# Patient Record
Sex: FEMALE | Race: OTHER | HISPANIC OR LATINO | URBAN - METROPOLITAN AREA
[De-identification: names, ages, dates, MRNs, and addresses within clinical notes are randomized per-mention and may not be internally consistent; named-entity substitution may affect disease eponyms.]

---

## 2017-06-04 ENCOUNTER — EMERGENCY (EMERGENCY)
Facility: HOSPITAL | Age: 47
LOS: 1 days | Discharge: HOME | End: 2017-06-04
Attending: EMERGENCY MEDICINE | Admitting: EMERGENCY MEDICINE
Payer: COMMERCIAL

## 2017-06-04 VITALS
SYSTOLIC BLOOD PRESSURE: 121 MMHG | TEMPERATURE: 98 F | OXYGEN SATURATION: 100 % | RESPIRATION RATE: 16 BRPM | HEART RATE: 76 BPM | DIASTOLIC BLOOD PRESSURE: 68 MMHG

## 2017-06-04 PROCEDURE — 99284 EMERGENCY DEPT VISIT MOD MDM: CPT

## 2017-06-04 RX ORDER — KETOROLAC TROMETHAMINE 30 MG/ML
60 SYRINGE (ML) INJECTION ONCE
Qty: 0 | Refills: 0 | Status: DISCONTINUED | OUTPATIENT
Start: 2017-06-04 | End: 2017-06-04

## 2017-06-04 RX ADMIN — Medication 60 MILLIGRAM(S): at 14:10

## 2017-06-04 RX ADMIN — Medication 60 MILLIGRAM(S): at 13:32

## 2017-06-04 NOTE — ED PROVIDER NOTE - MUSCULOSKELETAL MINIMAL EXAM
limited arom b/l shoulders secondary to pain. full prom but painful. tender over b/l biceps, no bony tenderness or deformity

## 2017-06-04 NOTE — ED PROVIDER NOTE - PMH
Arthritis    Disc  bulging  GERD (gastroesophageal reflux disease)    ITP (idiopathic thrombocytopenic purpura)

## 2017-06-04 NOTE — ED PROVIDER NOTE - OBJECTIVE STATEMENT
patient complaining of b/l shoulder pain. states right shoulder with pain greater than 1 month duration, worse with movement, hurting in biceps. denies trauma. left shoulder/bicep hurting since thursday, denies trauma. taking no otc due to concern about pmh of itp.  reports h/o spinal stenosis and is not under care of doctor, is on no pain management regimen. denies bruising, bleeding.

## 2017-06-04 NOTE — ED ADULT TRIAGE NOTE - CHIEF COMPLAINT QUOTE
C/o generalized joint pain and b/l shoulders radiating to bicep pain x 3 days. Denies fevers/chills/chest pain/sob/dizziness/lightheadedness. PMH ITP. Limited ROM to both shoulders. C/o generalized joint pain and b/l shoulders radiating to bicep pain and axillary area x 3 days. Denies fevers/chills/chest pain/sob/dizziness/lightheadedness/trauma/fall/heavy lifting. PMH ITP. Limited ROM to both shoulders.

## 2017-06-04 NOTE — ED PROVIDER NOTE - MEDICAL DECISION MAKING DETAILS
Attending Note (Alina): patient with b/l shoulder pain and bicep tenderness. likely muscle strain/OA.  normal neuro exam. will start trial of nsaids.

## 2017-06-04 NOTE — ED PROVIDER NOTE - NEUROLOGICAL, MLM
Alert and oriented, no focal deficits, no motor or sensory deficits.  equal power and sensation b/l.

## 2017-09-06 ENCOUNTER — OUTPATIENT (OUTPATIENT)
Dept: OUTPATIENT SERVICES | Facility: HOSPITAL | Age: 47
LOS: 1 days | Discharge: HOME | End: 2017-09-06

## 2017-09-06 DIAGNOSIS — D69.3 IMMUNE THROMBOCYTOPENIC PURPURA: ICD-10-CM

## 2017-09-07 ENCOUNTER — APPOINTMENT (OUTPATIENT)
Dept: HEMATOLOGY ONCOLOGY | Facility: CLINIC | Age: 47
End: 2017-09-07

## 2017-09-07 ENCOUNTER — RESULT REVIEW (OUTPATIENT)
Age: 47
End: 2017-09-07

## 2017-09-07 LAB
HCT VFR BLD CALC: 39 % — SIGNIFICANT CHANGE UP (ref 34.5–45)
HGB BLD-MCNC: 13.8 G/DL — SIGNIFICANT CHANGE UP (ref 11.5–15.5)
MCHC RBC-ENTMCNC: 28.8 PG — SIGNIFICANT CHANGE UP (ref 27–34)
MCHC RBC-ENTMCNC: 35.5 G/DL — SIGNIFICANT CHANGE UP (ref 32–36)
MCV RBC AUTO: 81 FL — SIGNIFICANT CHANGE UP (ref 80–100)
PLATELET # BLD AUTO: 150 K/UL — SIGNIFICANT CHANGE UP (ref 150–400)
RBC # BLD: 4.81 M/UL — SIGNIFICANT CHANGE UP (ref 3.8–5.2)
RBC # FLD: 13.3 % — SIGNIFICANT CHANGE UP (ref 10.3–14.5)
WBC # BLD: 8 K/UL — SIGNIFICANT CHANGE UP (ref 3.8–10.5)
WBC # FLD AUTO: 8 K/UL — SIGNIFICANT CHANGE UP (ref 3.8–10.5)

## 2017-09-23 ENCOUNTER — TRANSCRIPTION ENCOUNTER (OUTPATIENT)
Age: 47
End: 2017-09-23

## 2019-05-24 ENCOUNTER — OUTPATIENT (OUTPATIENT)
Dept: OUTPATIENT SERVICES | Facility: HOSPITAL | Age: 49
LOS: 1 days | Discharge: HOME | End: 2019-05-24

## 2019-05-24 DIAGNOSIS — D69.3 IMMUNE THROMBOCYTOPENIC PURPURA: ICD-10-CM

## 2019-06-18 NOTE — REASON FOR VISIT
[Follow-Up Visit] : a follow-up visit for [Blood Count Assessment] : blood count assessment [FreeTextEntry2] : thrombocytopenia

## 2019-06-18 NOTE — HISTORY OF PRESENT ILLNESS
[Disease:__________________________] : Disease: [unfilled] [de-identified] : The patient noted the onset of spontaneous ecchymosis over the left arm in the second week of November 2015. She was admitted to [facility] on November 17 2015 for evaluation of a low platelet count of 41 000 She has had a history of feeling ache in chest and back, legs and arms. No fever. No nausea and no vomiting. She remained at [facility] overnight November 18 on prednisone 60 mg PO daily. No mouth bleeding [de-identified] : Deann has not been seen in our office since 2016.

## 2019-06-19 ENCOUNTER — APPOINTMENT (OUTPATIENT)
Dept: HEMATOLOGY ONCOLOGY | Facility: CLINIC | Age: 49
End: 2019-06-19

## 2020-09-18 ENCOUNTER — OUTPATIENT (OUTPATIENT)
Dept: OUTPATIENT SERVICES | Facility: HOSPITAL | Age: 50
LOS: 1 days | Discharge: HOME | End: 2020-09-18

## 2020-09-18 DIAGNOSIS — D69.3 IMMUNE THROMBOCYTOPENIC PURPURA: ICD-10-CM

## 2020-09-23 ENCOUNTER — APPOINTMENT (OUTPATIENT)
Dept: HEMATOLOGY ONCOLOGY | Facility: CLINIC | Age: 50
End: 2020-09-23
Payer: COMMERCIAL

## 2020-09-23 ENCOUNTER — RESULT REVIEW (OUTPATIENT)
Age: 50
End: 2020-09-23

## 2020-09-23 ENCOUNTER — TRANSCRIPTION ENCOUNTER (OUTPATIENT)
Age: 50
End: 2020-09-23

## 2020-09-23 VITALS
OXYGEN SATURATION: 98 % | SYSTOLIC BLOOD PRESSURE: 126 MMHG | RESPIRATION RATE: 16 BRPM | DIASTOLIC BLOOD PRESSURE: 77 MMHG | BODY MASS INDEX: 35.04 KG/M2 | WEIGHT: 210.32 LBS | TEMPERATURE: 98.3 F | HEART RATE: 65 BPM | HEIGHT: 64.88 IN

## 2020-09-23 LAB
BASOPHILS # BLD AUTO: 0.01 K/UL — SIGNIFICANT CHANGE UP (ref 0–0.2)
BASOPHILS NFR BLD AUTO: 0.2 % — SIGNIFICANT CHANGE UP (ref 0–2)
EOSINOPHIL # BLD AUTO: 0.01 K/UL — SIGNIFICANT CHANGE UP (ref 0–0.5)
EOSINOPHIL NFR BLD AUTO: 0.2 % — SIGNIFICANT CHANGE UP (ref 0–6)
HCT VFR BLD CALC: 41.1 % — SIGNIFICANT CHANGE UP (ref 34.5–45)
HGB BLD-MCNC: 13 G/DL — SIGNIFICANT CHANGE UP (ref 11.5–15.5)
IMM GRANULOCYTES NFR BLD AUTO: 0.5 % — SIGNIFICANT CHANGE UP (ref 0–1.5)
LYMPHOCYTES # BLD AUTO: 1.42 K/UL — SIGNIFICANT CHANGE UP (ref 1–3.3)
LYMPHOCYTES # BLD AUTO: 23.8 % — SIGNIFICANT CHANGE UP (ref 13–44)
MCHC RBC-ENTMCNC: 26.7 PG — LOW (ref 27–34)
MCHC RBC-ENTMCNC: 31.6 G/DL — LOW (ref 32–36)
MCV RBC AUTO: 84.6 FL — SIGNIFICANT CHANGE UP (ref 80–100)
MONOCYTES # BLD AUTO: 0.46 K/UL — SIGNIFICANT CHANGE UP (ref 0–0.9)
MONOCYTES NFR BLD AUTO: 7.7 % — SIGNIFICANT CHANGE UP (ref 2–14)
NEUTROPHILS # BLD AUTO: 4.04 K/UL — SIGNIFICANT CHANGE UP (ref 1.8–7.4)
NEUTROPHILS NFR BLD AUTO: 67.6 % — SIGNIFICANT CHANGE UP (ref 43–77)
NRBC # BLD: 0 /100 WBCS — SIGNIFICANT CHANGE UP (ref 0–0)
PLATELET # BLD AUTO: 114 K/UL — LOW (ref 150–400)
RBC # BLD: 4.86 M/UL — SIGNIFICANT CHANGE UP (ref 3.8–5.2)
RBC # FLD: 14.5 % — SIGNIFICANT CHANGE UP (ref 10.3–14.5)
WBC # BLD: 5.97 K/UL — SIGNIFICANT CHANGE UP (ref 3.8–10.5)
WBC # FLD AUTO: 5.97 K/UL — SIGNIFICANT CHANGE UP (ref 3.8–10.5)

## 2020-09-23 PROCEDURE — 99205 OFFICE O/P NEW HI 60 MIN: CPT

## 2020-09-23 NOTE — REASON FOR VISIT
[Initial Consultation] : an initial consultation for [Blood Count Assessment] : blood count assessment [FreeTextEntry2] : I have a history of thrombocytopenia. I have not been seen by the office in 4 years

## 2020-09-23 NOTE — ASSESSMENT
[Supportive] : Goals of care discussed with patient: Supportive [Palliative Care Plan] : not applicable at this time [FreeTextEntry1] : This is my first "annual visit with ms SUKHWINDER Zacarias whom I have not seen in 4 years. Today I spent time discussing the history of new diagnosis over the past four years and her recent occurrence of mild thrombocytopenia. She showed me cell phone images of small bruising over her arms and her legs which may be seen with mild thrombocytopenia.\par Today the extent of her thrombocytopenia is 114 000 and there is no clumping no the review of the peripheral blood smear.. She has no symptoms and she may be recently diagnosed to have type 2 diabetes. \par Steroid therapy would be held at this time for concern of elevating her blood sugar.\par I would advise observation. She is asked to return in 4 weeks for repeat blood testing.\par Aspects of treatment in the future may include steroids or IV immune globulin if platelet counts are persistently below 50 000 or if bleeding occurs. Patient is aware to return to the office if spontaneous bleeding occurs

## 2020-09-23 NOTE — HISTORY OF PRESENT ILLNESS
[Disease:__________________________] : Disease: [unfilled] [de-identified] : The patient noted the onset of spontaneous ecchymosis over the left arm in the second week of November 2015. She was admitted to [facility] on November 17 2015 for evaluation of a low platelet count of 41 000 She has had a history of feeling ache in chest and back, legs and arms. No fever. No nausea and no vomiting. She remained at [facility] overnight November 18 on prednisone 60 mg PO daily. No mouth bleeding\par The patient was tapered off of prednisone and she did not have bleeding for four years.\par Her platelet count was 200 000 in January 2020; She did no have bleeding at that time.\par She is seen by a rheumatologist: Soy Ayala. She had been diagnosed to have fibromyalgia in 2017. She was also diagnosed to have type 2 diabetes on September 4. She was noted to have a platelet count of 100 000 on 09/04/2020

## 2020-09-23 NOTE — RESULTS/DATA
[FreeTextEntry1] : review of information in the EM HR: the patient has platelet count of 114 000 WBC 5.97 HGB 13.0

## 2020-09-24 LAB
ALBUMIN SERPL ELPH-MCNC: 4.6 G/DL
ALP BLD-CCNC: 89 U/L
ALT SERPL-CCNC: 23 U/L
ANION GAP SERPL CALC-SCNC: 16 MMOL/L
AST SERPL-CCNC: 29 U/L
BILIRUB SERPL-MCNC: 0.4 MG/DL
BUN SERPL-MCNC: 16 MG/DL
CALCIUM SERPL-MCNC: 10.1 MG/DL
CHLORIDE SERPL-SCNC: 104 MMOL/L
CO2 SERPL-SCNC: 22 MMOL/L
CREAT SERPL-MCNC: 0.81 MG/DL
GLUCOSE SERPL-MCNC: 101 MG/DL
HAV IGM SER QL: NONREACTIVE
HBV CORE IGM SER QL: NONREACTIVE
HBV SURFACE AG SER QL: NONREACTIVE
HCV AB SER QL: NONREACTIVE
HCV S/CO RATIO: 0.08 S/CO
POTASSIUM SERPL-SCNC: 4 MMOL/L
PROT SERPL-MCNC: 6.9 G/DL
SODIUM SERPL-SCNC: 142 MMOL/L

## 2020-10-21 ENCOUNTER — OUTPATIENT (OUTPATIENT)
Dept: OUTPATIENT SERVICES | Facility: HOSPITAL | Age: 50
LOS: 1 days | Discharge: HOME | End: 2020-10-21

## 2020-10-21 DIAGNOSIS — D69.3 IMMUNE THROMBOCYTOPENIC PURPURA: ICD-10-CM

## 2020-10-26 ENCOUNTER — RESULT REVIEW (OUTPATIENT)
Age: 50
End: 2020-10-26

## 2020-10-26 ENCOUNTER — APPOINTMENT (OUTPATIENT)
Dept: HEMATOLOGY ONCOLOGY | Facility: CLINIC | Age: 50
End: 2020-10-26
Payer: COMMERCIAL

## 2020-10-26 VITALS
BODY MASS INDEX: 33.86 KG/M2 | TEMPERATURE: 98.1 F | SYSTOLIC BLOOD PRESSURE: 120 MMHG | HEART RATE: 55 BPM | DIASTOLIC BLOOD PRESSURE: 73 MMHG | WEIGHT: 203.24 LBS | RESPIRATION RATE: 16 BRPM | HEIGHT: 64.88 IN | OXYGEN SATURATION: 98 %

## 2020-10-26 LAB
BASOPHILS # BLD AUTO: 0 K/UL — SIGNIFICANT CHANGE UP (ref 0–0.2)
BASOPHILS NFR BLD AUTO: 0 % — SIGNIFICANT CHANGE UP (ref 0–2)
EOSINOPHIL # BLD AUTO: 0 K/UL — SIGNIFICANT CHANGE UP (ref 0–0.5)
EOSINOPHIL NFR BLD AUTO: 0 % — SIGNIFICANT CHANGE UP (ref 0–6)
HCT VFR BLD CALC: 42.2 % — SIGNIFICANT CHANGE UP (ref 34.5–45)
HGB BLD-MCNC: 13.8 G/DL — SIGNIFICANT CHANGE UP (ref 11.5–15.5)
IMM GRANULOCYTES NFR BLD AUTO: 0.9 % — SIGNIFICANT CHANGE UP (ref 0–1.5)
LYMPHOCYTES # BLD AUTO: 1.28 K/UL — SIGNIFICANT CHANGE UP (ref 1–3.3)
LYMPHOCYTES # BLD AUTO: 23.1 % — SIGNIFICANT CHANGE UP (ref 13–44)
MCHC RBC-ENTMCNC: 27.2 PG — SIGNIFICANT CHANGE UP (ref 27–34)
MCHC RBC-ENTMCNC: 32.7 G/DL — SIGNIFICANT CHANGE UP (ref 32–36)
MCV RBC AUTO: 83.2 FL — SIGNIFICANT CHANGE UP (ref 80–100)
MONOCYTES # BLD AUTO: 0.37 K/UL — SIGNIFICANT CHANGE UP (ref 0–0.9)
MONOCYTES NFR BLD AUTO: 6.7 % — SIGNIFICANT CHANGE UP (ref 2–14)
NEUTROPHILS # BLD AUTO: 3.83 K/UL — SIGNIFICANT CHANGE UP (ref 1.8–7.4)
NEUTROPHILS NFR BLD AUTO: 69.3 % — SIGNIFICANT CHANGE UP (ref 43–77)
NRBC # BLD: 0 /100 WBCS — SIGNIFICANT CHANGE UP (ref 0–0)
PLATELET # BLD AUTO: 75 K/UL — LOW (ref 150–400)
RBC # BLD: 5.07 M/UL — SIGNIFICANT CHANGE UP (ref 3.8–5.2)
RBC # FLD: 14.5 % — SIGNIFICANT CHANGE UP (ref 10.3–14.5)
WBC # BLD: 5.53 K/UL — SIGNIFICANT CHANGE UP (ref 3.8–10.5)
WBC # FLD AUTO: 5.53 K/UL — SIGNIFICANT CHANGE UP (ref 3.8–10.5)

## 2020-10-26 PROCEDURE — 99214 OFFICE O/P EST MOD 30 MIN: CPT

## 2020-10-26 PROCEDURE — 99072 ADDL SUPL MATRL&STAF TM PHE: CPT

## 2020-10-27 NOTE — HISTORY OF PRESENT ILLNESS
[Disease:__________________________] : Disease: [unfilled] [de-identified] : 50 year old female presenting to [facility] for hematologic care. She is referred here from [facility]. Patient went to [facility] ED on 11/17/2015 due to easy bruising for 1-2 weeks. She noticed multiple bruises across her legs and arms; denied injuries/trauma, epistaxis, bleeding gums, rash, weakness, weight loss, or fatigue, chest pain, palpitations, shortness of breath. Her platelet count at time of arrival was 44,000. She was discharged that same day with prednisone 60 mg daily and followed up with Dr. Rony Granados at [facility] for outpatient follow-up. \par \par Patient was tapered off oral steroid therapy and she did not have bleeding/bruising from 2016 - 2020. Her platelet count was 200 000 in January 2020; She didn't have bleeding at that time. While on surveillance, she was seen by a rheumatologist: Dr. Soy Ricci. She was also diagnosed to have type 2 diabetes on September 4. She was noted to have a platelet count of 100 000 on 09/04/2020\par \par Past medical history includes borderline diabetes (diagnosed during September 8, 2020, recommended diet/exercise), hyperlipidemia, fibromyalgia (diagnosed 2017), H Pylori infection (diagnosed late December 2019 via endoscopy by Dr. Velásquez at [facility] - started antibiotics to 12/27/2019), fatty liver (confirmed via US abdomen on August 24 2020). No new medications. No hx of clotting disorders. Colonoscopy + endoscopy done on June 2020 (polyps removed, benign findings).   [FreeTextEntry1] : s/p oral steroid therapy; currently on surveillance [de-identified] : Patient presented to [facility] for routine blood count assessment and follow-up office visit. She continues to make a conscious effort in maintaining her diet and exercise regimen since her diabetes diagnosis last month. She denied easy bleeding/bruising at this time. Her current medication list includes Cholesterol Care Supplement, Vitamin D, Vitamin B12, FIsh Oil and cinnamon extract.

## 2020-10-27 NOTE — ASSESSMENT
[Supportive] : Goals of care discussed with patient: Supportive [Palliative Care Plan] : not applicable at this time [FreeTextEntry1] : Deann Wilcox is a 50 year old female presenting to [facility] for hematologic care of thrombocytopenia. Patient returned to the office last month after a 4 year hiatus due to recent recurrence of ecchymosis and low platelet counts from her rheumatologist's office. She provided cell phone images of small bruising over her arms and her legs which may be seen with mild thrombocytopenia.\par \par Patient appeared well and her physical examination findings remain stable. Today her platelet count is 75,000. She has no symptoms and steroid therapy is held due to her recent diabetes diagnosis. She agreed on the following: \par \par - Remain on observation as directed; return to the office next month for repeat blood testing.\par - Consideration of bone marrow biopsy; she does not recall completing this procedure in the past.  \par - Aspects of treatment in the future may include oral steroids or IV immune globulin if platelet counts are persistently below 50 000 and/or if bleeding occurs. \par - Patient is aware to return to the office sooner if spontaneous bleeding occurs. \par - Follow up with primary care and rheumatology as directed.

## 2020-11-11 ENCOUNTER — RESULT REVIEW (OUTPATIENT)
Age: 50
End: 2020-11-11

## 2020-11-11 ENCOUNTER — APPOINTMENT (OUTPATIENT)
Dept: HEMATOLOGY ONCOLOGY | Facility: CLINIC | Age: 50
End: 2020-11-11
Payer: COMMERCIAL

## 2020-11-11 VITALS
TEMPERATURE: 98.9 F | SYSTOLIC BLOOD PRESSURE: 117 MMHG | DIASTOLIC BLOOD PRESSURE: 78 MMHG | HEART RATE: 57 BPM | OXYGEN SATURATION: 95 % | WEIGHT: 200.18 LBS | RESPIRATION RATE: 16 BRPM | BODY MASS INDEX: 33.35 KG/M2 | HEIGHT: 64.88 IN

## 2020-11-11 LAB
BASOPHILS # BLD AUTO: 0 K/UL — SIGNIFICANT CHANGE UP (ref 0–0.2)
BASOPHILS NFR BLD AUTO: 0 % — SIGNIFICANT CHANGE UP (ref 0–2)
EOSINOPHIL # BLD AUTO: 0 K/UL — SIGNIFICANT CHANGE UP (ref 0–0.5)
EOSINOPHIL NFR BLD AUTO: 0 % — SIGNIFICANT CHANGE UP (ref 0–6)
HCT VFR BLD CALC: 41.4 % — SIGNIFICANT CHANGE UP (ref 34.5–45)
HGB BLD-MCNC: 13.4 G/DL — SIGNIFICANT CHANGE UP (ref 11.5–15.5)
IMM GRANULOCYTES NFR BLD AUTO: 0.2 % — SIGNIFICANT CHANGE UP (ref 0–1.5)
LYMPHOCYTES # BLD AUTO: 1.47 K/UL — SIGNIFICANT CHANGE UP (ref 1–3.3)
LYMPHOCYTES # BLD AUTO: 26.7 % — SIGNIFICANT CHANGE UP (ref 13–44)
MCHC RBC-ENTMCNC: 27.2 PG — SIGNIFICANT CHANGE UP (ref 27–34)
MCHC RBC-ENTMCNC: 32.4 G/DL — SIGNIFICANT CHANGE UP (ref 32–36)
MCV RBC AUTO: 84 FL — SIGNIFICANT CHANGE UP (ref 80–100)
MONOCYTES # BLD AUTO: 0.43 K/UL — SIGNIFICANT CHANGE UP (ref 0–0.9)
MONOCYTES NFR BLD AUTO: 7.8 % — SIGNIFICANT CHANGE UP (ref 2–14)
NEUTROPHILS # BLD AUTO: 3.6 K/UL — SIGNIFICANT CHANGE UP (ref 1.8–7.4)
NEUTROPHILS NFR BLD AUTO: 65.3 % — SIGNIFICANT CHANGE UP (ref 43–77)
NRBC # BLD: 0 /100 WBCS — SIGNIFICANT CHANGE UP (ref 0–0)
PLATELET # BLD AUTO: 76 K/UL — LOW (ref 150–400)
RBC # BLD: 4.93 M/UL — SIGNIFICANT CHANGE UP (ref 3.8–5.2)
RBC # FLD: 14.4 % — SIGNIFICANT CHANGE UP (ref 10.3–14.5)
WBC # BLD: 5.51 K/UL — SIGNIFICANT CHANGE UP (ref 3.8–10.5)
WBC # FLD AUTO: 5.51 K/UL — SIGNIFICANT CHANGE UP (ref 3.8–10.5)

## 2020-11-11 PROCEDURE — 99214 OFFICE O/P EST MOD 30 MIN: CPT

## 2020-11-11 PROCEDURE — 99072 ADDL SUPL MATRL&STAF TM PHE: CPT

## 2020-11-12 LAB
ALBUMIN SERPL ELPH-MCNC: 4.7 G/DL
ALP BLD-CCNC: 90 U/L
ALT SERPL-CCNC: 23 U/L
ANION GAP SERPL CALC-SCNC: 12 MMOL/L
AST SERPL-CCNC: 25 U/L
BILIRUB SERPL-MCNC: 0.5 MG/DL
BUN SERPL-MCNC: 15 MG/DL
CALCIUM SERPL-MCNC: 10.2 MG/DL
CHLORIDE SERPL-SCNC: 102 MMOL/L
CO2 SERPL-SCNC: 26 MMOL/L
CREAT SERPL-MCNC: 0.83 MG/DL
GLUCOSE SERPL-MCNC: 95 MG/DL
POTASSIUM SERPL-SCNC: 4.1 MMOL/L
PROT SERPL-MCNC: 6.9 G/DL
SODIUM SERPL-SCNC: 141 MMOL/L

## 2020-11-13 NOTE — REASON FOR VISIT
[Follow-Up Visit] : a follow-up visit for [Blood Count Assessment] : blood count assessment [FreeTextEntry2] : low platelet count; immune thrombocytopenia

## 2020-11-13 NOTE — ASSESSMENT
[Supportive] : Goals of care discussed with patient: Supportive [Palliative Care Plan] : not applicable at this time [FreeTextEntry1] : JARRELL Wilcox is a 50 year old female with a history of immune thrombocytopenia,. Shehas been off steroid treatment since 2016; [place] count has [place] [address] over two weeks. No mucous membrane bleeding. She feels well and she does not wish to begin steroid treatment for concern of exacerbation of her diabetes. NO bleeding in genital urinary or Gastro intestinal tract by history. We will continue to follow her off treatment and she will be seen by me and Mr MANUEL ERWIN in 4 weeks. She is avoiding use of aspirin, ibuprofen and she does not use alcohol beverage.

## 2020-11-13 NOTE — HISTORY OF PRESENT ILLNESS
[Disease:__________________________] : Disease: [unfilled] [Date: ____________] : Patient's last distress assessment performed on [unfilled]. [0 - No Distress] : Distress Level: 0 [de-identified] : The patient noted the onset of spontaneous ecchymosis over the left arm in the second week of November 2015. She was admitted to [facility] on November 17 2015 for evaluation of a low platelet count of 41 000 She has had a history of feeling ache in chest and back, legs and arms. No fever. No nausea and no vomiting. She remained at [facility] overnight November 18 1015 on prednisone 60 mg PO daily. No mouth bleeding\par The patient was tapered off of prednisone and she did not have bleeding for four years.\par Her platelet count was 200 000 in January 2020; She did no have bleeding at that time.\par She is seen by a rheumatologist: Soy Ayala. She had been diagnosed to have fibromyalgia in 2017. She was also diagnosed to have type 2 diabetes on September 4. She was noted to have a platelet count of 100 000 on 09/04/2020 [FreeTextEntry1] : history of steroid use, observation [de-identified] : Deann has not been seen in our office since 2016.She was referred by primary care in September because of a decline in platelet count. This represents her third follow up visit; no visible bleeding although she notes rare bruises over her arms and legs. Bruising is spontaneous; no ophthalmic or mucous membrane bleeding. No discoloration of urine or stool. No febrile illness. She is wearing a mask and she practices social distancing. Family is well. No one is known to have viral illness

## 2020-11-13 NOTE — PHYSICAL EXAM
[Restricted in physically strenuous activity but ambulatory and able to carry out work of a light or sedentary nature] : Status 1- Restricted in physically strenuous activity but ambulatory and able to carry out work of a light or sedentary nature, e.g., light house work, office work [Normal] : affect appropriate [de-identified] : cell phone images of fading bruising on arms and legs

## 2020-12-04 ENCOUNTER — OUTPATIENT (OUTPATIENT)
Dept: OUTPATIENT SERVICES | Facility: HOSPITAL | Age: 50
LOS: 1 days | Discharge: HOME | End: 2020-12-04

## 2020-12-04 DIAGNOSIS — D69.3 IMMUNE THROMBOCYTOPENIC PURPURA: ICD-10-CM

## 2020-12-08 ENCOUNTER — RESULT REVIEW (OUTPATIENT)
Age: 50
End: 2020-12-08

## 2020-12-08 ENCOUNTER — APPOINTMENT (OUTPATIENT)
Dept: HEMATOLOGY ONCOLOGY | Facility: CLINIC | Age: 50
End: 2020-12-08
Payer: COMMERCIAL

## 2020-12-08 VITALS
BODY MASS INDEX: 33.24 KG/M2 | TEMPERATURE: 97.5 F | RESPIRATION RATE: 16 BRPM | SYSTOLIC BLOOD PRESSURE: 119 MMHG | HEART RATE: 58 BPM | WEIGHT: 199.52 LBS | HEIGHT: 64.88 IN | OXYGEN SATURATION: 98 % | DIASTOLIC BLOOD PRESSURE: 85 MMHG

## 2020-12-08 DIAGNOSIS — R73.03 PREDIABETES.: ICD-10-CM

## 2020-12-08 DIAGNOSIS — D69.3 IMMUNE THROMBOCYTOPENIC PURPURA: ICD-10-CM

## 2020-12-08 LAB
BASOPHILS # BLD AUTO: 0 K/UL — SIGNIFICANT CHANGE UP (ref 0–0.2)
BASOPHILS NFR BLD AUTO: 0 % — SIGNIFICANT CHANGE UP (ref 0–2)
EOSINOPHIL # BLD AUTO: 0.01 K/UL — SIGNIFICANT CHANGE UP (ref 0–0.5)
EOSINOPHIL NFR BLD AUTO: 0.2 % — SIGNIFICANT CHANGE UP (ref 0–6)
HCT VFR BLD CALC: 40.4 % — SIGNIFICANT CHANGE UP (ref 34.5–45)
HGB BLD-MCNC: 13.7 G/DL — SIGNIFICANT CHANGE UP (ref 11.5–15.5)
IMM GRANULOCYTES NFR BLD AUTO: 0.3 % — SIGNIFICANT CHANGE UP (ref 0–1.5)
LYMPHOCYTES # BLD AUTO: 1.46 K/UL — SIGNIFICANT CHANGE UP (ref 1–3.3)
LYMPHOCYTES # BLD AUTO: 23.1 % — SIGNIFICANT CHANGE UP (ref 13–44)
MCHC RBC-ENTMCNC: 27.5 PG — SIGNIFICANT CHANGE UP (ref 27–34)
MCHC RBC-ENTMCNC: 33.9 G/DL — SIGNIFICANT CHANGE UP (ref 32–36)
MCV RBC AUTO: 81.1 FL — SIGNIFICANT CHANGE UP (ref 80–100)
MONOCYTES # BLD AUTO: 0.49 K/UL — SIGNIFICANT CHANGE UP (ref 0–0.9)
MONOCYTES NFR BLD AUTO: 7.8 % — SIGNIFICANT CHANGE UP (ref 2–14)
NEUTROPHILS # BLD AUTO: 4.34 K/UL — SIGNIFICANT CHANGE UP (ref 1.8–7.4)
NEUTROPHILS NFR BLD AUTO: 68.6 % — SIGNIFICANT CHANGE UP (ref 43–77)
NRBC # BLD: 0 /100 WBCS — SIGNIFICANT CHANGE UP (ref 0–0)
PLATELET # BLD AUTO: 82 K/UL — LOW (ref 150–400)
RBC # BLD: 4.98 M/UL — SIGNIFICANT CHANGE UP (ref 3.8–5.2)
RBC # FLD: 14.6 % — HIGH (ref 10.3–14.5)
WBC # BLD: 6.32 K/UL — SIGNIFICANT CHANGE UP (ref 3.8–10.5)
WBC # FLD AUTO: 6.32 K/UL — SIGNIFICANT CHANGE UP (ref 3.8–10.5)

## 2020-12-08 PROCEDURE — 99214 OFFICE O/P EST MOD 30 MIN: CPT

## 2020-12-08 PROCEDURE — 99072 ADDL SUPL MATRL&STAF TM PHE: CPT

## 2020-12-08 NOTE — ASSESSMENT
[Supportive] : Goals of care discussed with patient: Supportive [Palliative Care Plan] : not applicable at this time [FreeTextEntry1] : Deann Wilcox is a 50 year old female presenting to [facility] for hematologic care of thrombocytopenia. Patient returned to the office in September 2020 after a 4 year hiatus due to recent recurrence of ecchymosis and low platelet counts from her rheumatologist office. She provided cell phone images of small bruising over her arms and her legs which may be seen with mild thrombocytopenia. Patient appeared well and her physical examination findings remain stable. Today her platelet count is 82,000. She has no symptoms and steroid therapy is held due to her current diabetes diagnosis. She agreed on the following: \par \par - Remain on observation as directed; no plans for bone marrow biopsy at this time. \par - Treatments to consider in the future may include oral steroids or IV immune globulin if platelet counts are persistently below 50 000 and/or if bleeding occurs. \par - Patient is aware to return to the office sooner if spontaneous bleeding occurs. \par - Follow up with primary care and rheumatology as directed. \par - Return to the office in 6 months. Discussed with Dr. Rony Granados.

## 2020-12-08 NOTE — REVIEW OF SYSTEMS
[Joint Pain] : joint pain [Easy Bruising] : a tendency for easy bruising [Negative] : Allergic/Immunologic [Fever] : no fever [Chills] : no chills [Night Sweats] : no night sweats [Fatigue] : no fatigue [Recent Change In Weight] : ~T no recent weight change [Eye Pain] : no eye pain [Red Eyes] : eyes not red [Dry Eyes] : no dryness of the eyes [Vision Problems] : no vision problems [Dysphagia] : no dysphagia [Loss of Hearing] : no loss of hearing [Nosebleeds] : no nosebleeds [Hoarseness] : no hoarseness [Odynophagia] : no odynophagia [Mucosal Pain] : no mucosal pain [Chest Pain] : no chest pain [Palpitations] : no palpitations [Leg Claudication] : no intermittent leg claudication [Lower Ext Edema] : no lower extremity edema [Shortness Of Breath] : no shortness of breath [Wheezing] : no wheezing [Cough] : no cough [SOB on Exertion] : no shortness of breath during exertion [Abdominal Pain] : no abdominal pain [Vomiting] : no vomiting [Constipation] : no constipation [Diarrhea] : no diarrhea [Dysuria] : no dysuria [Incontinence] : no incontinence [Vaginal Discharge] : no vaginal discharge [Dysmenorrhea/Abn Vaginal Bleeding] : no dysmenorrhea/abnormal vaginal bleeding [Joint Stiffness] : no joint stiffness [Muscle Pain] : no muscle pain [Skin Rash] : no skin rash [Skin Wound] : no skin wound [Confused] : no confusion [Dizziness] : no dizziness [Fainting] : no fainting [Difficulty Walking] : no difficulty walking [Suicidal] : not suicidal [Insomnia] : no insomnia [Anxiety] : no anxiety [Depression] : no depression [Proptosis] : no proptosis [Hot Flashes] : no hot flashes [Muscle Weakness] : no muscle weakness [Deepening Of The Voice] : no deepening of the voice [Easy Bleeding] : no tendency for easy bleeding [Swollen Glands] : no swollen glands

## 2021-10-30 NOTE — HISTORY OF PRESENT ILLNESS
[Disease:__________________________] : Disease: [unfilled] [de-identified] : 50 year old female presenting to [facility] for hematologic care. She is referred here from [facility]. Patient went to [facility] ED on 11/17/2015 due to easy bruising for 1-2 weeks. She noticed multiple bruises across her legs and arms; denied injuries/trauma, epistaxis, bleeding gums, rash, weakness, weight loss, or fatigue, chest pain, palpitations, shortness of breath. Her platelet count at time of arrival was 44,000. She was discharged that same day with prednisone 60 mg daily and followed up with Dr. Rony Granados at [facility] for outpatient follow-up. \par \par Patient was tapered off oral steroid therapy and she did not have bleeding/bruising from 2016 - 2020. Her platelet count was 200 000 in January 2020; She didn't have bleeding at that time. While on surveillance, she was seen by a rheumatologist: Dr. Soy Ricci. She was also diagnosed to have type 2 diabetes on September 4. She was noted to have a platelet count of 100 000 on 09/04/2020\par \par Past medical history includes borderline diabetes (diagnosed during September 8, 2020, recommended diet/exercise), hyperlipidemia, fibromyalgia (diagnosed 2017), H Pylori infection (diagnosed late December 2019 via endoscopy by Dr. Velásquez at [facility] - started antibiotics to 12/27/2019), fatty liver (confirmed via US abdomen on August 24 2020). No new medications. No hx of clotting disorders. Colonoscopy + endoscopy done on June 2020 (polyps removed, benign findings).   [FreeTextEntry1] : s/p oral steroid therapy; currently on surveillance [de-identified] : Patient presented to [facility] for routine blood count assessment and follow-up office visit. She continues to make a conscious effort in maintaining her diet and exercise regimen since her diabetes diagnosis earlier this year (walking is her main exercise, admitted to 16 lb intentional weight loss over the last 2 months, most recent A1c decreased to 5.9% as of 11/9/2020). Despite mild bruising in her lower extremities, she denied easy bleeding, recent trauma/injury, recent infection, recent hospitalizations at this time.  Refer to the Assessment tab to view/cancel completed assessment.

## 2025-01-07 NOTE — ED PROVIDER NOTE - SKIN NEGATIVE STATEMENT, MLM
Mildly elevated glucose-not sure if it was fasting;  Total bilirubin unchanged.  He should see his PCP non urgently. no abrasions, no jaundice, no lesions, no pruritis, and no rashes.